# Patient Record
Sex: MALE | Race: WHITE | NOT HISPANIC OR LATINO
[De-identification: names, ages, dates, MRNs, and addresses within clinical notes are randomized per-mention and may not be internally consistent; named-entity substitution may affect disease eponyms.]

---

## 2017-05-01 ENCOUNTER — APPOINTMENT (OUTPATIENT)
Dept: SURGERY | Facility: CLINIC | Age: 72
End: 2017-05-01

## 2017-05-01 ENCOUNTER — TRANSCRIPTION ENCOUNTER (OUTPATIENT)
Age: 72
End: 2017-05-01

## 2017-05-01 VITALS
WEIGHT: 213 LBS | DIASTOLIC BLOOD PRESSURE: 70 MMHG | HEIGHT: 72 IN | BODY MASS INDEX: 28.85 KG/M2 | SYSTOLIC BLOOD PRESSURE: 122 MMHG

## 2017-05-01 DIAGNOSIS — N40.0 BENIGN PROSTATIC HYPERPLASIA WITHOUT LOWER URINARY TRACT SYMPMS: ICD-10-CM

## 2017-05-01 DIAGNOSIS — Z86.79 PERSONAL HISTORY OF OTHER DISEASES OF THE CIRCULATORY SYSTEM: ICD-10-CM

## 2017-05-01 PROBLEM — Z00.00 ENCOUNTER FOR PREVENTIVE HEALTH EXAMINATION: Status: ACTIVE | Noted: 2017-05-01

## 2017-05-05 ENCOUNTER — OUTPATIENT (OUTPATIENT)
Dept: OUTPATIENT SERVICES | Facility: HOSPITAL | Age: 72
LOS: 1 days | Discharge: HOME | End: 2017-05-05

## 2017-05-23 ENCOUNTER — APPOINTMENT (OUTPATIENT)
Dept: SURGERY | Facility: CLINIC | Age: 72
End: 2017-05-23

## 2017-05-23 VITALS
BODY MASS INDEX: 29.12 KG/M2 | WEIGHT: 215 LBS | HEIGHT: 72 IN | SYSTOLIC BLOOD PRESSURE: 146 MMHG | DIASTOLIC BLOOD PRESSURE: 78 MMHG

## 2017-06-28 DIAGNOSIS — K40.91 UNILATERAL INGUINAL HERNIA, WITHOUT OBSTRUCTION OR GANGRENE, RECURRENT: ICD-10-CM

## 2017-06-28 DIAGNOSIS — Z88.0 ALLERGY STATUS TO PENICILLIN: ICD-10-CM

## 2018-07-19 ENCOUNTER — INPATIENT (INPATIENT)
Facility: HOSPITAL | Age: 73
LOS: 1 days | Discharge: HOME | End: 2018-07-21
Attending: SURGERY | Admitting: SURGERY
Payer: MEDICARE

## 2018-07-19 ENCOUNTER — RESULT REVIEW (OUTPATIENT)
Age: 73
End: 2018-07-19

## 2018-07-19 VITALS
HEART RATE: 90 BPM | TEMPERATURE: 98 F | RESPIRATION RATE: 18 BRPM | DIASTOLIC BLOOD PRESSURE: 102 MMHG | OXYGEN SATURATION: 96 % | SYSTOLIC BLOOD PRESSURE: 179 MMHG

## 2018-07-19 LAB
ALBUMIN SERPL ELPH-MCNC: 4.1 G/DL — SIGNIFICANT CHANGE UP (ref 3.5–5.2)
ALP SERPL-CCNC: 79 U/L — SIGNIFICANT CHANGE UP (ref 30–115)
ALT FLD-CCNC: 15 U/L — SIGNIFICANT CHANGE UP (ref 0–41)
ANION GAP SERPL CALC-SCNC: 11 MMOL/L — SIGNIFICANT CHANGE UP (ref 7–14)
APTT BLD: 33.5 SEC — SIGNIFICANT CHANGE UP (ref 27–39.2)
AST SERPL-CCNC: 20 U/L — SIGNIFICANT CHANGE UP (ref 0–41)
BILIRUB SERPL-MCNC: 1.1 MG/DL — SIGNIFICANT CHANGE UP (ref 0.2–1.2)
BLD GP AB SCN SERPL QL: SIGNIFICANT CHANGE UP
BUN SERPL-MCNC: 18 MG/DL — SIGNIFICANT CHANGE UP (ref 10–20)
CALCIUM SERPL-MCNC: 9.2 MG/DL — SIGNIFICANT CHANGE UP (ref 8.5–10.1)
CHLORIDE SERPL-SCNC: 99 MMOL/L — SIGNIFICANT CHANGE UP (ref 98–110)
CO2 SERPL-SCNC: 28 MMOL/L — SIGNIFICANT CHANGE UP (ref 17–32)
CREAT SERPL-MCNC: 0.9 MG/DL — SIGNIFICANT CHANGE UP (ref 0.7–1.5)
GLUCOSE SERPL-MCNC: 110 MG/DL — HIGH (ref 70–99)
HCT VFR BLD CALC: 52.6 % — HIGH (ref 42–52)
HGB BLD-MCNC: 17.8 G/DL — SIGNIFICANT CHANGE UP (ref 14–18)
INR BLD: 1.08 RATIO — SIGNIFICANT CHANGE UP (ref 0.65–1.3)
MCHC RBC-ENTMCNC: 28.2 PG — SIGNIFICANT CHANGE UP (ref 27–31)
MCHC RBC-ENTMCNC: 33.8 G/DL — SIGNIFICANT CHANGE UP (ref 32–37)
MCV RBC AUTO: 83.2 FL — SIGNIFICANT CHANGE UP (ref 80–94)
NRBC # BLD: 0 /100 WBCS — SIGNIFICANT CHANGE UP (ref 0–0)
PLATELET # BLD AUTO: 280 K/UL — SIGNIFICANT CHANGE UP (ref 130–400)
POTASSIUM SERPL-MCNC: 4.4 MMOL/L — SIGNIFICANT CHANGE UP (ref 3.5–5)
POTASSIUM SERPL-SCNC: 4.4 MMOL/L — SIGNIFICANT CHANGE UP (ref 3.5–5)
PROT SERPL-MCNC: 6.6 G/DL — SIGNIFICANT CHANGE UP (ref 6–8)
PROTHROM AB SERPL-ACNC: 11.7 SEC — SIGNIFICANT CHANGE UP (ref 9.95–12.87)
RBC # BLD: 6.32 M/UL — HIGH (ref 4.7–6.1)
RBC # FLD: 12.3 % — SIGNIFICANT CHANGE UP (ref 11.5–14.5)
SODIUM SERPL-SCNC: 138 MMOL/L — SIGNIFICANT CHANGE UP (ref 135–146)
TYPE + AB SCN PNL BLD: SIGNIFICANT CHANGE UP
WBC # BLD: 9.85 K/UL — SIGNIFICANT CHANGE UP (ref 4.8–10.8)
WBC # FLD AUTO: 9.85 K/UL — SIGNIFICANT CHANGE UP (ref 4.8–10.8)

## 2018-07-19 PROCEDURE — 49651 LAP ING HERNIA REPAIR RECUR: CPT | Mod: 22,LT

## 2018-07-19 PROCEDURE — 99285 EMERGENCY DEPT VISIT HI MDM: CPT | Mod: 57,25,AI

## 2018-07-19 RX ORDER — ALFUZOSIN HYDROCHLORIDE 10 MG/1
1 TABLET, EXTENDED RELEASE ORAL
Qty: 0 | Refills: 0 | COMMUNITY

## 2018-07-19 RX ORDER — SODIUM CHLORIDE 9 MG/ML
1000 INJECTION, SOLUTION INTRAVENOUS
Qty: 0 | Refills: 0 | Status: DISCONTINUED | OUTPATIENT
Start: 2018-07-20 | End: 2018-07-20

## 2018-07-19 RX ORDER — FAMOTIDINE 10 MG/ML
20 INJECTION INTRAVENOUS ONCE
Qty: 0 | Refills: 0 | Status: COMPLETED | OUTPATIENT
Start: 2018-07-19 | End: 2018-07-19

## 2018-07-19 RX ORDER — DUTASTERIDE 0.5 MG/1
1 CAPSULE, LIQUID FILLED ORAL
Qty: 0 | Refills: 0 | COMMUNITY

## 2018-07-19 RX ORDER — CEFOTETAN DISODIUM 1 G
VIAL (EA) INJECTION
Qty: 0 | Refills: 0 | Status: DISCONTINUED | OUTPATIENT
Start: 2018-07-19 | End: 2018-07-19

## 2018-07-19 RX ORDER — IOHEXOL 300 MG/ML
30 INJECTION, SOLUTION INTRAVENOUS ONCE
Qty: 0 | Refills: 0 | Status: COMPLETED | OUTPATIENT
Start: 2018-07-19 | End: 2018-07-19

## 2018-07-19 RX ADMIN — IOHEXOL 30 MILLILITER(S): 300 INJECTION, SOLUTION INTRAVENOUS at 15:29

## 2018-07-19 RX ADMIN — FAMOTIDINE 20 MILLIGRAM(S): 10 INJECTION INTRAVENOUS at 13:53

## 2018-07-19 NOTE — H&P ADULT - ASSESSMENT
ASSESSMENT:  72y Male ***    PLAN:   Patient will be taken for level one  Repair of incarcerated inguinal hernia laparoscopically with mesh  Pre-op labs  NPO  IVF  IV ABX    --------------------------------------------------------------------------------------    07-19-18 @ 20:47 ASSESSMENT:  72y Male patient with recurrent, incarcerated left inguinal hernia with CT findings of SBO, possible closed loop obstruction    PLAN:   Patient will be taken for level one  Repair of incarcerated inguinal hernia laparoscopically with mesh  Pre-op labs  NPO  IVF  IV ABX    --------------------------------------------------------------------------------------    07-19-18 @ 20:47

## 2018-07-19 NOTE — ED ADULT NURSE NOTE - OBJECTIVE STATEMENT
pt comes in with complaints of left inguinal hernia pain and swelling since yesterday. was seen prior for same issue which was reduced in er.

## 2018-07-19 NOTE — ED PROVIDER NOTE - OBJECTIVE STATEMENT
72 y m hx BPH and recurrent inguinal hernias s/p multiple repairs presents for irreducible hernia. Since yesterday, accompanied by pain radiating up abdomen which has mostly resolved, L sided. + 1 episode vomiting, poor appetite. Patient states he is usually able to reduce hernia on his own however this time seemed too difficult to reduce. Patient with no fever or chills. No urinary c/o. No testicular pain.

## 2018-07-19 NOTE — H&P ADULT - NSHPLABSRESULTS_GEN_ALL_CORE
LABS  --------------------------------------------------------------------------------------  Labs:  CAPILLARY BLOOD GLUCOSE                        17.8   9.85  )-----------( 280      ( 19 Jul 2018 14:35 )             52.6      07-19    138  |  99  |  18  ----------------------------<  110<H>  4.4   |  28  |  0.9    Calcium, Total Serum: 9.2 mg/dL (07-19-18 @ 14:35)    LFTs:             6.6  | 1.1  | 20       ------------------[79      ( 19 Jul 2018 14:35 )  4.1  | x    | 15          Lipase:x      Amylase:x        Coags:     11.70  ----< 1.08    ( 19 Jul 2018 14:35 )     33.5     --------------------------------------------------------------------------------------  IMAGING RESULTS  < from: CT Abdomen and Pelvis w/ Oral Cont and w/ IV Cont (07.19.18 @ 18:12) >  IMPRESSION:     Closed loop small bowel obstruction at the level of the left inguinal   hernia. Area of small bowel wall thickening distal to the hernia sac as   well as intra-abdominal fluid. Findings are worrisome for developing   vascular compromise. Surgical consultation is advised.    < end of copied text >

## 2018-07-19 NOTE — H&P ADULT - HISTORY OF PRESENT ILLNESS
HPI:   72y Male with h/o recurrent left inguinal hernia repaired in 2008 with mesh and again in 2017 laparoscopically via pre-peritoneal approach with mesh. He presents with 2 months of left groin bulge and intermittent pain, now significantly worse pain and increased swelling in groin that is not reducible since yesterday afternoon. Pain is constant, in left groin around hernia, and associated nausea and vomit x2, bilious. + decreased appetite and no flatus passed since yesterday. No skin changes.     PAST MEDICAL & SURGICAL HISTORY:  Inguinal hernia: left  BPH (benign prostatic hyperplasia)    Home Meds: Home Medications:  alfuzosin 10 mg oral tablet, extended release: 1 tab(s) orally once a day (19 Jul 2018 14:01)  Avodart 0.5 mg oral capsule: 1 cap(s) orally once a day (19 Jul 2018 14:01)    Allergies: Allergies  penicillin (Unknown)  penicillins (Unknown)    Soc:   Past smoker, no current EtOH use  Advanced Directives: Presumed Full Code   ---------------------------------------------------------------------------------------

## 2018-07-19 NOTE — ED PROVIDER NOTE - NS ED ROS FT
Review of Systems    Constitutional: (-) fever  Cardiovascular: (-) chest pain, (-) syncope  Respiratory: (-) cough, (-) shortness of breath  Gastrointestinal: (-) vomiting, (-) diarrhea, (-) abdominal pain. + L inguinal hernia  Musculoskeletal: (-) neck pain, (-) back pain, (-) joint pain  Integumentary: (-) rash, (-) edema  Neurological: (-) headache, (-) altered mental status    Except as documented in the HPI, all other systems are negative.

## 2018-07-19 NOTE — H&P ADULT - ATTENDING COMMENTS
incarcerated inguinal hernia.  emergent case.   will take him to the OR.  risk , benefit and alternatives explained.

## 2018-07-19 NOTE — ED PROVIDER NOTE - CRITICAL CARE PROVIDED
additional history taking/direct patient care (not related to procedure)/documentation/interpretation of diagnostic studies/consultation with other physicians/consult w/ pt's family directly relating to pts condition

## 2018-07-19 NOTE — ED PROVIDER NOTE - PROGRESS NOTE DETAILS
patient placed in reverse trendelenburg and ice placed on hernia for 1/2 hour. still unable to reduce. case dw surgery dr. Brown who will assess patient Dr. Brown at bedside, still unable to reduce. labs and imaging ordered. He will speak to attending. Patient w minimal pain. will continue to assess. per surgery, add PO contrast. To d/w Dr. Nolasco. Radiology aware. Patient comfortable. will continue to assess.

## 2018-07-19 NOTE — H&P ADULT - NSHPPHYSICALEXAM_GEN_ALL_CORE
VITAL SIGNS, INS/OUTS (last 24 hours):  --------------------------------------------------------------------------------------  Vital Signs Last 24 Hrs  T(C): 36.4 (19 Jul 2018 15:40), Max: 36.8 (19 Jul 2018 13:24)  T(F): 97.6 (19 Jul 2018 15:40), Max: 98.2 (19 Jul 2018 13:24)  HR: 74 (19 Jul 2018 15:40) (74 - 90)  BP: 157/88 (19 Jul 2018 15:40) (157/88 - 179/102)  RR: 18 (19 Jul 2018 15:40) (18 - 18)  SpO2: 98% (19 Jul 2018 15:40) (96% - 98%)    --------------------------------------------------------------------------------------  PHYSICAL EXAM    General: NAD, AAOx3, calm and cooperative  HEENT: NCAT, JAMAL, EOMI, Trachea ML, Neck supple  Cardiac: RRR S1, S2, no Murmurs, rubs or gallops  Respiratory: CTAB, normal respiratory effort, breath sounds equal BL, no wheeze, rhonchi or crackles  Abdomen: Soft, non-distended, non-tender, +bowel sounds  L grin: incarcerated inguinal hernia, 75% reducible, +tenderness, no skin changes

## 2018-07-19 NOTE — ED PROVIDER NOTE - PHYSICAL EXAMINATION
VITAL SIGNS: I have reviewed nursing notes and confirm.  CONSTITUTIONAL: Well-developed; well-nourished; in no acute distress. Comfortable appearing, ambulating in ED  SKIN: Skin exam is warm and dry  HEAD: Normocephalic; atraumatic.  EYES: EOM intact; conjunctiva and sclera clear.  ENT: No nasal discharge; airway clear.   NECK: Supple; non tender.  CARD: S1, S2 normal; Regular rate and rhythm.  RESP: No wheezes, rales or rhonchi.  ABD: Normal bowel sounds; soft; non-distended; + L inguinal hernia, approx 6 cm in diameter, not easily reducible. no overlying skin changes. Mild ttp. No other abdominal ttp  EXT: Normal ROM. No LE edema.  NEURO: Alert, oriented. Grossly unremarkable. No focal deficits.  PSYCH: Cooperative, appropriate.

## 2018-07-20 ENCOUNTER — TRANSCRIPTION ENCOUNTER (OUTPATIENT)
Age: 73
End: 2018-07-20

## 2018-07-20 RX ORDER — HEPARIN SODIUM 5000 [USP'U]/ML
5000 INJECTION INTRAVENOUS; SUBCUTANEOUS EVERY 8 HOURS
Qty: 0 | Refills: 0 | Status: DISCONTINUED | OUTPATIENT
Start: 2018-07-20 | End: 2018-07-21

## 2018-07-20 RX ORDER — HYDROMORPHONE HYDROCHLORIDE 2 MG/ML
2 INJECTION INTRAMUSCULAR; INTRAVENOUS; SUBCUTANEOUS
Qty: 0 | Refills: 0 | Status: DISCONTINUED | OUTPATIENT
Start: 2018-07-20 | End: 2018-07-20

## 2018-07-20 RX ORDER — FINASTERIDE 5 MG/1
5 TABLET, FILM COATED ORAL DAILY
Qty: 0 | Refills: 0 | Status: DISCONTINUED | OUTPATIENT
Start: 2018-07-20 | End: 2018-07-21

## 2018-07-20 RX ORDER — MEPERIDINE HYDROCHLORIDE 50 MG/ML
12.5 INJECTION INTRAMUSCULAR; INTRAVENOUS; SUBCUTANEOUS
Qty: 0 | Refills: 0 | Status: DISCONTINUED | OUTPATIENT
Start: 2018-07-20 | End: 2018-07-20

## 2018-07-20 RX ORDER — HYDROMORPHONE HYDROCHLORIDE 2 MG/ML
1 INJECTION INTRAMUSCULAR; INTRAVENOUS; SUBCUTANEOUS
Qty: 0 | Refills: 0 | Status: DISCONTINUED | OUTPATIENT
Start: 2018-07-20 | End: 2018-07-20

## 2018-07-20 RX ORDER — OXYCODONE AND ACETAMINOPHEN 5; 325 MG/1; MG/1
1 TABLET ORAL EVERY 4 HOURS
Qty: 0 | Refills: 0 | Status: DISCONTINUED | OUTPATIENT
Start: 2018-07-20 | End: 2018-07-20

## 2018-07-20 RX ORDER — MORPHINE SULFATE 50 MG/1
4 CAPSULE, EXTENDED RELEASE ORAL EVERY 6 HOURS
Qty: 0 | Refills: 0 | Status: DISCONTINUED | OUTPATIENT
Start: 2018-07-20 | End: 2018-07-21

## 2018-07-20 RX ORDER — MORPHINE SULFATE 50 MG/1
2 CAPSULE, EXTENDED RELEASE ORAL EVERY 6 HOURS
Qty: 0 | Refills: 0 | Status: DISCONTINUED | OUTPATIENT
Start: 2018-07-20 | End: 2018-07-21

## 2018-07-20 RX ORDER — ACETAMINOPHEN 500 MG
650 TABLET ORAL EVERY 6 HOURS
Qty: 0 | Refills: 0 | Status: DISCONTINUED | OUTPATIENT
Start: 2018-07-20 | End: 2018-07-21

## 2018-07-20 RX ORDER — ONDANSETRON 8 MG/1
4 TABLET, FILM COATED ORAL ONCE
Qty: 0 | Refills: 0 | Status: DISCONTINUED | OUTPATIENT
Start: 2018-07-20 | End: 2018-07-20

## 2018-07-20 RX ORDER — SODIUM CHLORIDE 9 MG/ML
250 INJECTION INTRAMUSCULAR; INTRAVENOUS; SUBCUTANEOUS ONCE
Qty: 0 | Refills: 0 | Status: DISCONTINUED | OUTPATIENT
Start: 2018-07-20 | End: 2018-07-20

## 2018-07-20 RX ORDER — MOXIFLOXACIN HYDROCHLORIDE TABLETS, 400 MG 400 MG/1
1 TABLET, FILM COATED ORAL
Qty: 14 | Refills: 0 | OUTPATIENT
Start: 2018-07-20 | End: 2018-07-26

## 2018-07-20 RX ORDER — ACETAMINOPHEN 500 MG
975 TABLET ORAL ONCE
Qty: 0 | Refills: 0 | Status: DISCONTINUED | OUTPATIENT
Start: 2018-07-20 | End: 2018-07-20

## 2018-07-20 RX ORDER — METRONIDAZOLE 500 MG
1 TABLET ORAL
Qty: 21 | Refills: 0 | OUTPATIENT
Start: 2018-07-20 | End: 2018-07-26

## 2018-07-20 RX ORDER — SODIUM CHLORIDE 9 MG/ML
1000 INJECTION, SOLUTION INTRAVENOUS
Qty: 0 | Refills: 0 | Status: DISCONTINUED | OUTPATIENT
Start: 2018-07-20 | End: 2018-07-20

## 2018-07-20 RX ORDER — FINASTERIDE 5 MG/1
0.5 TABLET, FILM COATED ORAL DAILY
Qty: 0 | Refills: 0 | Status: DISCONTINUED | OUTPATIENT
Start: 2018-07-20 | End: 2018-07-20

## 2018-07-20 RX ORDER — PANTOPRAZOLE SODIUM 20 MG/1
40 TABLET, DELAYED RELEASE ORAL
Qty: 0 | Refills: 0 | Status: DISCONTINUED | OUTPATIENT
Start: 2018-07-20 | End: 2018-07-21

## 2018-07-20 RX ADMIN — SODIUM CHLORIDE 100 MILLILITER(S): 9 INJECTION, SOLUTION INTRAVENOUS at 01:37

## 2018-07-20 RX ADMIN — PANTOPRAZOLE SODIUM 40 MILLIGRAM(S): 20 TABLET, DELAYED RELEASE ORAL at 05:27

## 2018-07-20 RX ADMIN — HEPARIN SODIUM 5000 UNIT(S): 5000 INJECTION INTRAVENOUS; SUBCUTANEOUS at 21:37

## 2018-07-20 RX ADMIN — FINASTERIDE 5 MILLIGRAM(S): 5 TABLET, FILM COATED ORAL at 12:15

## 2018-07-20 RX ADMIN — HEPARIN SODIUM 5000 UNIT(S): 5000 INJECTION INTRAVENOUS; SUBCUTANEOUS at 05:27

## 2018-07-20 RX ADMIN — SODIUM CHLORIDE 75 MILLILITER(S): 9 INJECTION, SOLUTION INTRAVENOUS at 10:37

## 2018-07-20 NOTE — DISCHARGE NOTE ADULT - CARE PLAN
Principal Discharge DX:	Unilateral inguinal hernia with obstruction and without gangrene, recurrence not specified  Goal:	Full recovery  Assessment and plan of treatment:	Incarcerated left inguinal hernia repair. Antibiotic treatment with ciprofloxacin and metronidazole (flagyl) for 1 week. No heavy lifting or strenuous activity. Keep wound dry. See Dr. Preston in clinic in 1-2 weeks. Call for appointment.

## 2018-07-20 NOTE — CHART NOTE - NSCHARTNOTEFT_GEN_A_CORE
PACU ANESTHESIA ADMISSION NOTE      Procedure:   Post op diagnosis:  Incarcerated hernia      ____  Intubated  TV:______       Rate: ______      FiO2: ______    __x__  Patent Airway    _x___  Full return of protective reflexes    ___x_  Full recovery from anesthesia / back to baseline status    Vitals:  T(C):99.4 (07-20-18 @ 01:37), Max: 36.8 (07-19-18 @ 13:24)  HR: 84 (07-20-18 @01:37) (74 - 90)  BP: 149/83 (07-20-18 @ 01:37) (157/88 - 179/102)  RR: 18 (07-20-18 @ 01:37) (18 - 20)  SpO2: 99% (07-20-18 @ 01:37) (96% - 98%)    Mental Status:  __x__ Awake   ____x_ Alert   _____ Drowsy   _____ Sedated    Nausea/Vomiting:  __x__ NO  ______Yes,   See Post - Op Orders          Pain Scale (0-10):  ___5__    Treatment: ____ None    ___x_ See Post - Op/PCA Orders    Post - Operative Fluids:   ____ Oral   __x__ See Post - Op Orders    Plan: Discharge:   ____Home       __x___Floor     _____Critical Care    _____  Other:_________________    Comments: Transferred care to PACU; discharge to floor when criteria met.

## 2018-07-20 NOTE — DISCHARGE NOTE ADULT - PATIENT PORTAL LINK FT
You can access the [facility] Patient Portal, offered by [facility], by registering with the following website: [email or web address]

## 2018-07-20 NOTE — DISCHARGE NOTE ADULT - MEDICATION SUMMARY - MEDICATIONS TO TAKE
I will START or STAY ON the medications listed below when I get home from the hospital:    Avodart 0.5 mg oral capsule  -- 1 cap(s) by mouth once a day  -- Indication: For Home medication    Flagyl 500 mg oral tablet  -- 1 tab(s) by mouth 3 times a day   -- Do not drink alcoholic beverages when taking this medication.  Finish all this medication unless otherwise directed by prescriber.  May discolor urine or feces.    -- Indication: For Antibiotic    alfuzosin 10 mg oral tablet, extended release  -- 1 tab(s) by mouth once a day  -- Indication: For Home medication    Cipro 500 mg oral tablet  -- 1 tab(s) by mouth 2 times a day   -- Avoid prolonged or excessive exposure to direct and/or artificial sunlight while taking this medication.  Check with your doctor before becoming pregnant.  Do not take dairy products, antacids, or iron preparations within one hour of this medication.  Finish all this medication unless otherwise directed by prescriber.  Medication should be taken with plenty of water.    -- Indication: For Antibiotic

## 2018-07-20 NOTE — DISCHARGE NOTE ADULT - CARE PROVIDER_API CALL
Yonatan Preston), Surgery  [address] [place] [address]  [address] [place] [address]  Phone: [phone]  Fax: [phone]

## 2018-07-20 NOTE — DISCHARGE NOTE ADULT - INSTRUCTIONS
Scrotal support, no heavy lifting or strenuous exercises. 2 Kerlex placed for scrotal support. Diet as tolerated. Keep wound clean and dry.

## 2018-07-20 NOTE — DISCHARGE NOTE ADULT - CARE PROVIDERS DIRECT ADDRESSES
[email or web address]

## 2018-07-20 NOTE — DISCHARGE NOTE ADULT - ADDITIONAL INSTRUCTIONS
FOLLOW UP:  1. Follow up with Dr Preston in 1-2 weeks. Call office for appointment.   2. Follow up with PMD within 1 week  3. Scrotal support with 2 kerlex  4. Antibiotic treatment x 1 week with Cipro and Flagyl  5. Keep wound clean and dry. Can remove the dressing in 2 days, Dont remove the steri strips, can take a shower after removing the dressing. no strenous activity or heavy lifting.  6. If experience fever, chest pain, shortness of breath, dizziness, vomiting , bleeding or drainage from wound call mD or return to ED  7. Take tylenol and motrin as needed for pain.

## 2018-07-20 NOTE — BRIEF OPERATIVE NOTE - PROCEDURE
<<-----Click on this checkbox to enter Procedure Repair of incarcerated left inguinal hernia  07/20/2018    Active  LMOKO

## 2018-07-20 NOTE — DISCHARGE NOTE ADULT - PLAN OF CARE
Full recovery Incarcerated left inguinal hernia repair. Antibiotic treatment with ciprofloxacin and metronidazole (flagyl) for 1 week. No heavy lifting or strenuous activity. Keep wound dry. See Dr. Preston in clinic in 1-2 weeks. Call for appointment.

## 2018-07-20 NOTE — PROGRESS NOTE ADULT - SUBJECTIVE AND OBJECTIVE BOX
Progress Note: General Surgery  Patient: CAMMIE BURTON , 72y (1945)Male   MRN: [id number]  Location: [address]  Visit: 07-19-18 Inpatient  Date: 07-20-18 @ 05:48  Hospital Day: 2  Post-op Day: 0    Procedure/Diagnosis: S/p laparoscopic repair of incarcerated left inguinal hernia, extensive lysis of adhesions  Events over 24h: No acute postoperative events. Patient afebrile, no active complaints. No bowel movements or flatus. Non-ambulatory. Pain adequately managed.     Vitals: T(F): 98.2 (07-20-18 @ 03:35), Max: 99.4 (07-20-18 @ 01:37)  HR: 83 (07-20-18 @ 03:35)  BP: 127/66 (07-20-18 @ 03:35) (127/66 - 179/102)  RR: 18 (07-20-18 @ 03:35)  SpO2: 97% (07-20-18 @ 02:37)    In:   07-19-18 @ 07:01  -  07-20-18 @ 05:48  --------------------------------------------------------  IN: 430 mL    Out:   07-19-18 @ 07:01  -  07-20-18 @ 05:48  --------------------------------------------------------  OUT:    Indwelling Catheter - Urethral: 100 mL  Total OUT: 100 mL    Net:   07-19-18 @ 07:01  -  07-20-18 @ 05:48  --------------------------------------------------------  NET: 330 mL    Diet: Diet, NPO:   Except Medications (07-20-18 @ 01:55)    IV Fluids: yes, Type: lactated ringers. 1000 milliLiter(s) (100 mL/Hr) IV Continuous <Continuous>    Physical Examination:  General Appearance: NAD, alert and cooperative  HEENT: NCAT, WNL  Heart: S1 and S2. No murmurs. Rhythm is regular irregular.   Lungs: Clear to auscultation BL without rales, rhonchi, wheezing, crackles or diminished breath sounds.  Abdomen:  Soft, nondistended, shantel-incisional tenderness. No rigidity, guarding, or rebound tenderness. Periumbilical erythema appreciated.   Skin: Warm/dry, Normal color, No jaundice.   Incisions/Wounds: Dressings in place, clean, dry and intact, no signs of infection/active bleeding/drainage    Medications: [Standing]  heparin  Injectable 5000 Unit(s) SubCutaneous every 8 hours  lactated ringers. 1000 milliLiter(s) (100 mL/Hr) IV Continuous <Continuous>  pantoprazole    Tablet 40 milliGRAM(s) Oral before breakfast    DVT Prophylaxis: heparin  Injectable 5000 Unit(s) SubCutaneous every 8 hours  GI Prophylaxis: pantoprazole    Tablet 40 milliGRAM(s) Oral before breakfast    Antibiotics: None    Medications:[PRN]  acetaminophen   Tablet. 650 milliGRAM(s) Oral every 6 hours PRN  morphine  - Injectable 2 milliGRAM(s) IV Push every 6 hours PRN  morphine  - Injectable 4 milliGRAM(s) IV Push every 6 hours PRN    Labs (preop):                        17.8   9.85  )-----------( 280      ( 19 Jul 2018 14:35 )             52.6     07-19    138  |  99  |  18  ----------------------------<  110<H>  4.4   |  28  |  0.9    Ca    9.2      19 Jul 2018 14:35    TPro  6.6  /  Alb  4.1  /  TBili  1.1  /  DBili  x   /  AST  20  /  ALT  15  /  AlkPhos  79  07-19    LIVER FUNCTIONS - ( 19 Jul 2018 14:35 )  Alb: 4.1 g/dL / Pro: 6.6 g/dL / ALK PHOS: 79 U/L / ALT: 15 U/L / AST: 20 U/L / GGT: x           PT/INR - ( 19 Jul 2018 14:35 )   PT: 11.70 sec;   INR: 1.08 ratio    PTT - ( 19 Jul 2018 14:35 )  PTT:33.5 sec    Imaging:    CT Abdomen and Pelvis w/ Oral Cont and w/ IV Cont (07.19.18 @ 18:12)  IMPRESSION:     Closed loop small bowel obstruction at the level of the left inguinal   hernia. Area of small bowel wall thickening distal to the hernia sac as   well as intra-abdominal fluid. Findings are worrisome for developing   vascular compromise. Surgical consultation is advised.    Assessment:  72y Male patient admitted S/P laparoscopic repair of incarcerated left inguinal hernia, extensive lysis of adhesions, with the above physical exam, labs, and imaging findings.    Plan:  Encourage ambulation, ICS use  D/c sotelo today  Advance diet as tolerated  Pain management    Date/Time: 07-20-18 @ 05:48

## 2018-07-21 LAB
ANION GAP SERPL CALC-SCNC: 8 MMOL/L — SIGNIFICANT CHANGE UP (ref 7–14)
BASOPHILS # BLD AUTO: 0.07 K/UL — SIGNIFICANT CHANGE UP (ref 0–0.2)
BASOPHILS NFR BLD AUTO: 0.9 % — SIGNIFICANT CHANGE UP (ref 0–1)
BUN SERPL-MCNC: 13 MG/DL — SIGNIFICANT CHANGE UP (ref 10–20)
CALCIUM SERPL-MCNC: 8.4 MG/DL — LOW (ref 8.5–10.1)
CHLORIDE SERPL-SCNC: 101 MMOL/L — SIGNIFICANT CHANGE UP (ref 98–110)
CO2 SERPL-SCNC: 29 MMOL/L — SIGNIFICANT CHANGE UP (ref 17–32)
CREAT SERPL-MCNC: 0.8 MG/DL — SIGNIFICANT CHANGE UP (ref 0.7–1.5)
EOSINOPHIL # BLD AUTO: 0.21 K/UL — SIGNIFICANT CHANGE UP (ref 0–0.7)
EOSINOPHIL NFR BLD AUTO: 2.7 % — SIGNIFICANT CHANGE UP (ref 0–8)
GLUCOSE SERPL-MCNC: 95 MG/DL — SIGNIFICANT CHANGE UP (ref 70–99)
HCT VFR BLD CALC: 43.1 % — SIGNIFICANT CHANGE UP (ref 42–52)
HGB BLD-MCNC: 14.4 G/DL — SIGNIFICANT CHANGE UP (ref 14–18)
IMM GRANULOCYTES NFR BLD AUTO: 0.3 % — SIGNIFICANT CHANGE UP (ref 0.1–0.3)
LYMPHOCYTES # BLD AUTO: 1.58 K/UL — SIGNIFICANT CHANGE UP (ref 1.2–3.4)
LYMPHOCYTES # BLD AUTO: 19.9 % — LOW (ref 20.5–51.1)
MAGNESIUM SERPL-MCNC: 2 MG/DL — SIGNIFICANT CHANGE UP (ref 1.8–2.4)
MCHC RBC-ENTMCNC: 28.6 PG — SIGNIFICANT CHANGE UP (ref 27–31)
MCHC RBC-ENTMCNC: 33.4 G/DL — SIGNIFICANT CHANGE UP (ref 32–37)
MCV RBC AUTO: 85.5 FL — SIGNIFICANT CHANGE UP (ref 80–94)
MONOCYTES # BLD AUTO: 1.04 K/UL — HIGH (ref 0.1–0.6)
MONOCYTES NFR BLD AUTO: 13.1 % — HIGH (ref 1.7–9.3)
NEUTROPHILS # BLD AUTO: 5 K/UL — SIGNIFICANT CHANGE UP (ref 1.4–6.5)
NEUTROPHILS NFR BLD AUTO: 63.1 % — SIGNIFICANT CHANGE UP (ref 42.2–75.2)
NRBC # BLD: 0 /100 WBCS — SIGNIFICANT CHANGE UP (ref 0–0)
PHOSPHATE SERPL-MCNC: 2.6 MG/DL — SIGNIFICANT CHANGE UP (ref 2.1–4.9)
PLATELET # BLD AUTO: 232 K/UL — SIGNIFICANT CHANGE UP (ref 130–400)
POTASSIUM SERPL-MCNC: 4.3 MMOL/L — SIGNIFICANT CHANGE UP (ref 3.5–5)
POTASSIUM SERPL-SCNC: 4.3 MMOL/L — SIGNIFICANT CHANGE UP (ref 3.5–5)
RBC # BLD: 5.04 M/UL — SIGNIFICANT CHANGE UP (ref 4.7–6.1)
RBC # FLD: 12.7 % — SIGNIFICANT CHANGE UP (ref 11.5–14.5)
SODIUM SERPL-SCNC: 138 MMOL/L — SIGNIFICANT CHANGE UP (ref 135–146)
WBC # BLD: 7.92 K/UL — SIGNIFICANT CHANGE UP (ref 4.8–10.8)
WBC # FLD AUTO: 7.92 K/UL — SIGNIFICANT CHANGE UP (ref 4.8–10.8)

## 2018-07-21 RX ORDER — CIPROFLOXACIN LACTATE 400MG/40ML
500 VIAL (ML) INTRAVENOUS ONCE
Qty: 0 | Refills: 0 | Status: COMPLETED | OUTPATIENT
Start: 2018-07-21 | End: 2018-07-21

## 2018-07-21 RX ORDER — METRONIDAZOLE 500 MG
500 TABLET ORAL ONCE
Qty: 0 | Refills: 0 | Status: COMPLETED | OUTPATIENT
Start: 2018-07-21 | End: 2018-07-21

## 2018-07-21 RX ORDER — TAMSULOSIN HYDROCHLORIDE 0.4 MG/1
0.4 CAPSULE ORAL AT BEDTIME
Qty: 0 | Refills: 0 | Status: DISCONTINUED | OUTPATIENT
Start: 2018-07-21 | End: 2018-07-21

## 2018-07-21 RX ADMIN — Medication 500 MILLIGRAM(S): at 09:35

## 2018-07-21 RX ADMIN — PANTOPRAZOLE SODIUM 40 MILLIGRAM(S): 20 TABLET, DELAYED RELEASE ORAL at 05:07

## 2018-07-21 RX ADMIN — HEPARIN SODIUM 5000 UNIT(S): 5000 INJECTION INTRAVENOUS; SUBCUTANEOUS at 05:07

## 2018-07-21 NOTE — PROGRESS NOTE ADULT - ASSESSMENT
Assessment:  72y Male patient admitted S/P *** , with the above physical exam, labs, and imaging findings.    Plan:  -If tolerates diet, possible d/c home  -Encourage out of bed  -Incentive spirometry

## 2018-07-21 NOTE — PROGRESS NOTE ADULT - SUBJECTIVE AND OBJECTIVE BOX
Progress Note: General Surgery  Patient: CAMMIE BURTON , 72y (1945)Male   MRN: [id number]  Location: [address]  Visit: 07-19-18 Inpatient  Date: 07-21-18 @ 00:47  Hospital Day: 3  Post-op Day: 1    Procedure/Diagnosis: s/p lap repair of incarcerated hernia, l inguinal hernia with extensive lysis of adhesions  Events over 24h: Patient states he is feeling better after the procedure, complains of mild tenderness near incision in left groin.  Patient states he is voiding, passing gas, denies BM, is ambulating, and is tolerating a full liquid diet.  Denies fever, chills, nausea, vomiting, chest pain, shortness of breath.    Vitals: T(F): 98.9 (07-21-18 @ 00:00), Max: 99.4 (07-20-18 @ 01:37)  HR: 68 (07-21-18 @ 00:00)  BP: 167/81 (07-21-18 @ 00:00) (115/67 - 167/81)  RR: 17 (07-21-18 @ 00:00)  SpO2: 96% (07-21-18 @ 00:00)    In:   07-19-18 @ 07:01  -  07-20-18 @ 07:00  --------------------------------------------------------  IN: 430 mL    07-20-18 @ 07:01  -  07-21-18 @ 00:47  --------------------------------------------------------  IN: 1240 mL      Out:   07-19-18 @ 07:01  -  07-20-18 @ 07:00  --------------------------------------------------------  OUT:    Indwelling Catheter - Urethral: 100 mL  Total OUT: 100 mL      07-20-18 @ 07:01  -  07-21-18 @ 00:47  --------------------------------------------------------  OUT:    Indwelling Catheter - Urethral: 750 mL    Post-Void Residual per Intermittent Catheterization: 500 mL    Voided: 550 mL  Total OUT: 1800 mL        Net:   07-19-18 @ 07:01  -  07-20-18 @ 07:00  --------------------------------------------------------  NET: 330 mL    07-20-18 @ 07:01  -  07-21-18 @ 00:47  --------------------------------------------------------  NET: -560 mL      Diet: Diet, Full Liquid (07-20-18 @ 15:29)    IV Fluids: yes no , Type:   Physical Examination:  General Appearance: NAD, alert and cooperative  HEENT: NCAT, WNL  Heart: S1 and S2. No murmurs. Rhythm is regular    Lungs: Clear to auscultation BL   Abdomen:  Positive bowel sounds. Soft, nondistended, mild shantel-incisional tenderness   MSK/Extremities: ROM intact BL upper/lower extremities.   Skin: Warm/dry, Normal color, No jaundice.   Incisions/Wounds: Dressings in place, clean, dry and intact    Medications: [Standing]  finasteride 5 milliGRAM(s) Oral daily  heparin  Injectable 5000 Unit(s) SubCutaneous every 8 hours  pantoprazole    Tablet 40 milliGRAM(s) Oral before breakfast    DVT Prophylaxis: heparin  Injectable 5000 Unit(s) SubCutaneous every 8 hours    GI Prophylaxis: pantoprazole    Tablet 40 milliGRAM(s) Oral before breakfast    Antibiotics:   Anticoagulation:   Medications:[PRN]  acetaminophen   Tablet. 650 milliGRAM(s) Oral every 6 hours PRN  morphine  - Injectable 2 milliGRAM(s) IV Push every 6 hours PRN  morphine  - Injectable 4 milliGRAM(s) IV Push every 6 hours PRN    Labs:                        17.8   9.85  )-----------( 280      ( 19 Jul 2018 14:35 )             52.6     07-19    138  |  99  |  18  ----------------------------<  110<H>  4.4   |  28  |  0.9    Ca    9.2      19 Jul 2018 14:35    TPro  6.6  /  Alb  4.1  /  TBili  1.1  /  DBili  x   /  AST  20  /  ALT  15  /  AlkPhos  79  07-19    LIVER FUNCTIONS - ( 19 Jul 2018 14:35 )  Alb: 4.1 g/dL / Pro: 6.6 g/dL / ALK PHOS: 79 U/L / ALT: 15 U/L / AST: 20 U/L / GGT: x           PT/INR - ( 19 Jul 2018 14:35 )   PT: 11.70 sec;   INR: 1.08 ratio         PTT - ( 19 Jul 2018 14:35 )  PTT:33.5 sec        Urine/Micro:      Imaging:  None/24h          Date/Time: 07-21-18 @ 00:47

## 2018-07-22 VITALS
OXYGEN SATURATION: 96 % | SYSTOLIC BLOOD PRESSURE: 133 MMHG | RESPIRATION RATE: 18 BRPM | DIASTOLIC BLOOD PRESSURE: 66 MMHG | HEART RATE: 83 BPM | TEMPERATURE: 98 F

## 2018-07-23 PROBLEM — K40.90 UNILATERAL INGUINAL HERNIA, WITHOUT OBSTRUCTION OR GANGRENE, NOT SPECIFIED AS RECURRENT: Chronic | Status: ACTIVE | Noted: 2018-07-19

## 2018-07-23 PROBLEM — N40.0 BENIGN PROSTATIC HYPERPLASIA WITHOUT LOWER URINARY TRACT SYMPTOMS: Chronic | Status: ACTIVE | Noted: 2018-07-19

## 2018-07-24 LAB — SURGICAL PATHOLOGY STUDY: SIGNIFICANT CHANGE UP

## 2018-07-25 ENCOUNTER — APPOINTMENT (OUTPATIENT)
Dept: SURGERY | Facility: CLINIC | Age: 73
End: 2018-07-25
Payer: MEDICARE

## 2018-07-25 VITALS
DIASTOLIC BLOOD PRESSURE: 80 MMHG | HEIGHT: 72 IN | WEIGHT: 204 LBS | SYSTOLIC BLOOD PRESSURE: 134 MMHG | BODY MASS INDEX: 27.63 KG/M2

## 2018-07-25 PROCEDURE — 99024 POSTOP FOLLOW-UP VISIT: CPT

## 2018-07-26 DIAGNOSIS — Z87.891 PERSONAL HISTORY OF NICOTINE DEPENDENCE: ICD-10-CM

## 2018-07-26 DIAGNOSIS — N40.0 BENIGN PROSTATIC HYPERPLASIA WITHOUT LOWER URINARY TRACT SYMPTOMS: ICD-10-CM

## 2018-07-26 DIAGNOSIS — K40.31 UNILATERAL INGUINAL HERNIA, WITH OBSTRUCTION, WITHOUT GANGRENE, RECURRENT: ICD-10-CM

## 2018-07-26 DIAGNOSIS — Z88.0 ALLERGY STATUS TO PENICILLIN: ICD-10-CM

## 2018-09-12 ENCOUNTER — APPOINTMENT (OUTPATIENT)
Dept: SURGERY | Facility: CLINIC | Age: 73
End: 2018-09-12
Payer: MEDICARE

## 2018-09-12 VITALS
BODY MASS INDEX: 28.04 KG/M2 | DIASTOLIC BLOOD PRESSURE: 82 MMHG | WEIGHT: 207 LBS | HEIGHT: 72 IN | SYSTOLIC BLOOD PRESSURE: 128 MMHG

## 2018-09-12 DIAGNOSIS — K40.91 UNILATERAL INGUINAL HERNIA, W/OUT OBSTRUCTION OR GANGRENE, RECURRENT: ICD-10-CM

## 2018-09-12 PROCEDURE — 99024 POSTOP FOLLOW-UP VISIT: CPT

## 2019-02-22 NOTE — DISCHARGE NOTE ADULT - NS MD DC PLAN IMMU FLU REF OTH
Attempted to contact home phone. Number no longer in service. This has been removed.    Attempted to contact patient on cell phone. No answer, no voicemail.     Multiple attempts have been made to contact patient. Encounter will be closed   Out of season

## 2021-01-15 ENCOUNTER — OUTPATIENT (OUTPATIENT)
Dept: OUTPATIENT SERVICES | Facility: HOSPITAL | Age: 76
LOS: 1 days | Discharge: HOME | End: 2021-01-15
Payer: MEDICARE

## 2021-01-15 PROCEDURE — 78306 BONE IMAGING WHOLE BODY: CPT | Mod: 26

## 2021-01-15 PROCEDURE — 78803 RP LOCLZJ TUM SPECT 1 AREA: CPT | Mod: 26

## 2021-01-20 DIAGNOSIS — C61 MALIGNANT NEOPLASM OF PROSTATE: ICD-10-CM

## 2021-03-02 ENCOUNTER — NON-APPOINTMENT (OUTPATIENT)
Age: 76
End: 2021-03-02

## 2022-06-20 ENCOUNTER — NON-APPOINTMENT (OUTPATIENT)
Age: 77
End: 2022-06-20

## 2022-06-22 ENCOUNTER — APPOINTMENT (OUTPATIENT)
Dept: ORTHOPEDIC SURGERY | Facility: CLINIC | Age: 77
End: 2022-06-22

## 2022-06-22 VITALS — WEIGHT: 185 LBS | BODY MASS INDEX: 25.06 KG/M2 | HEIGHT: 72 IN

## 2022-06-22 DIAGNOSIS — Z78.9 OTHER SPECIFIED HEALTH STATUS: ICD-10-CM

## 2022-06-22 DIAGNOSIS — S62.116A: ICD-10-CM

## 2022-06-22 DIAGNOSIS — Z85.46 PERSONAL HISTORY OF MALIGNANT NEOPLASM OF PROSTATE: ICD-10-CM

## 2022-06-22 PROCEDURE — 99203 OFFICE O/P NEW LOW 30 MIN: CPT

## 2022-06-22 PROCEDURE — L3908: CPT

## 2022-06-22 RX ORDER — ALFUZOSIN HYDROCHLORIDE 10 MG/1
10 TABLET, EXTENDED RELEASE ORAL DAILY
Qty: 30 | Refills: 0 | Status: ACTIVE | COMMUNITY
Start: 2022-06-22

## 2022-06-22 RX ORDER — DUTASTERIDE 0.5 MG/1
0.5 CAPSULE, LIQUID FILLED ORAL
Qty: 30 | Refills: 0 | Status: COMPLETED | COMMUNITY
Start: 2022-06-22 | End: 2022-06-22

## 2022-06-22 NOTE — HISTORY OF PRESENT ILLNESS
[de-identified] : Patient is a 76 year old male presenting today for triquetrum fracture. Patient was walking and tripped and fell on 6/17/2022. Patient originally thought he was fine and then began to develop pain and bruising along the top of the foot. He has a history of gout so he initially thought this was a gout flare. Went to see Urgent care yesterday and was dx with a fracture and placed in a brace.

## 2022-06-22 NOTE — ASSESSMENT
[FreeTextEntry1] : Reviewed x-ray findings with patient.  Discussed with him that this type of fracture can be treated conservatively with a cock-up wrist brace.\par  reinforced with patient the importance of wearing the brace consistently 24/7 aside from hygiene\par  will continue with rest, ice and activity modification\par  will continue with OTC analgesics as needed for pain\par  patient expressed understanding of outlined care plan and will follow up in 4 weeks for repeat evaluation.

## 2022-06-22 NOTE — PHYSICAL EXAM
[Normal Coordination] : normal coordination [Normal Sensation] : normal sensation [Orientated] : orientated [Normal Skin] : normal skin [Left] : left hand [5___] : grasp 5[unfilled]/5 [de-identified] : General appearance the patient is unremarkable, well developed, well nourished, well groomed with no deformities.  Patient is alert and oriented.  Patient is able to communicate clearly.  Visible skin on the head, face, right upper extremity, left upper extremity and bilateral lower extremities are normal showing no rashes, lesions or ulcers.  Normal peripheral vascular system.  Normal coordination, mood and affect. [] : no atrophy [de-identified] : Noted.  [FreeTextEntry9] : Decreased ROM of the wrist and hand secondary to swelling.  [de-identified] : Pain noted with strength testing.

## 2022-08-04 ENCOUNTER — APPOINTMENT (OUTPATIENT)
Dept: ORTHOPEDIC SURGERY | Facility: CLINIC | Age: 77
End: 2022-08-04

## 2022-08-04 VITALS — BODY MASS INDEX: 28.44 KG/M2 | HEIGHT: 72 IN | WEIGHT: 210 LBS

## 2022-08-04 DIAGNOSIS — S62.115A NONDISPLACED FRACTURE OF TRIQUETRUM [CUNEIFORM] BONE, LEFT WRIST, INITIAL ENCOUNTER FOR CLOSED FRACTURE: ICD-10-CM

## 2022-08-04 DIAGNOSIS — S62.115D NONDISPLACED FRACTURE OF TRIQUETRUM [CUNEIFORM] BONE, LEFT WRIST, SUBSEQUENT ENCOUNTER FOR FRACTURE WITH ROUTINE HEALING: ICD-10-CM

## 2022-08-04 PROCEDURE — 99213 OFFICE O/P EST LOW 20 MIN: CPT

## 2022-08-04 NOTE — DISCUSSION/SUMMARY
[de-identified] :   He will continue to ice and rest the area.\par May return to normal activities as tolerated.\par He understands he may may have random residual pain for 6 months to year.\par I explained the SL widening and degenerative changes.\par He will follow up on as-needed basis.\par All questions were answered today.\par \par This patient was seen under the supervision of Dr. Khan.\par

## 2022-08-04 NOTE — DATA REVIEWED
[FreeTextEntry1] :   X-rays repeated in the office today of his left wrist show a healing nondisplaced triquetrum avulsion fracture.  There is widening of the SL ligament and signs of a SLAC wrist which are related to the acute injury.  He also has some CMC joint arthritis.

## 2022-08-04 NOTE — PHYSICAL EXAM
[de-identified] :   Physical exam of his left wrist:  Negative swelling or ecchymosis.  Nontender over the distal radius or distal ulna.  Negative anatomical snuffbox tenderness.  Nontender over the triquetrum.  He has some mild pain over the CMC joint.  Positive CMC grind.  He can make a full fist.  No pain with range of motion of the wrist.  Sensory and motor are intact.

## 2022-08-04 NOTE — HISTORY OF PRESENT ILLNESS
[de-identified] :   Patient is a 76-year-old male here for repeat evaluation of his left wrist.  He is about 6 weeks status post a triquetrum avulsion fracture.He is feeling much better.  He does have some mild thumb pain which he did not have prior to the fall.

## 2023-04-20 ENCOUNTER — NON-APPOINTMENT (OUTPATIENT)
Age: 78
End: 2023-04-20

## 2023-05-18 NOTE — PROGRESS NOTE ADULT - NSHPATTENDINGPLANDISCUSS_GEN_ALL_CORE
Please call the [facility] at [phone], select OPTION #2,  if any of your medications change.  This means: if a med is discontinued, a new med is started, or a dose is changed.  These meds may interact with your warfarin and we may need to adjust your dose.  This is especially important if you start any type of ANTIBIOTIC.    Also, please call if you have any admissions to the hospital, visits to an emergency room, procedures planned, or if any other medical provider has instructed you to hold your warfarin.    
pt and surgical staff
pt and surgical staff